# Patient Record
Sex: MALE | Race: WHITE | NOT HISPANIC OR LATINO | Employment: UNEMPLOYED | URBAN - NONMETROPOLITAN AREA
[De-identification: names, ages, dates, MRNs, and addresses within clinical notes are randomized per-mention and may not be internally consistent; named-entity substitution may affect disease eponyms.]

---

## 2017-10-03 ENCOUNTER — OFFICE VISIT (OUTPATIENT)
Dept: NEUROSURGERY | Facility: CLINIC | Age: 47
End: 2017-10-03

## 2017-10-03 VITALS
WEIGHT: 280 LBS | DIASTOLIC BLOOD PRESSURE: 95 MMHG | BODY MASS INDEX: 40.09 KG/M2 | HEIGHT: 70 IN | SYSTOLIC BLOOD PRESSURE: 158 MMHG

## 2017-10-03 DIAGNOSIS — F17.200 SMOKER: ICD-10-CM

## 2017-10-03 DIAGNOSIS — M48.061 SPINAL STENOSIS, LUMBAR REGION, WITHOUT NEUROGENIC CLAUDICATION: Primary | ICD-10-CM

## 2017-10-03 PROCEDURE — 99203 OFFICE O/P NEW LOW 30 MIN: CPT | Performed by: NURSE PRACTITIONER

## 2017-10-03 RX ORDER — LISINOPRIL 40 MG/1
TABLET ORAL
Refills: 0 | COMMUNITY
Start: 2017-09-22

## 2017-10-03 RX ORDER — HYDROCODONE BITARTRATE AND ACETAMINOPHEN 10; 325 MG/1; MG/1
TABLET ORAL
Refills: 0 | COMMUNITY
Start: 2017-09-25

## 2017-10-03 RX ORDER — OMEPRAZOLE 40 MG/1
CAPSULE, DELAYED RELEASE ORAL
Refills: 0 | COMMUNITY
Start: 2017-09-22

## 2017-10-03 RX ORDER — GABAPENTIN 800 MG/1
TABLET ORAL
Refills: 2 | COMMUNITY
Start: 2017-09-25

## 2017-10-03 RX ORDER — HYDROCHLOROTHIAZIDE 25 MG/1
TABLET ORAL
Refills: 0 | COMMUNITY
Start: 2017-09-22

## 2017-10-03 NOTE — PROGRESS NOTES
Chief complaint:   Chief Complaint   Patient presents with   • Back Pain     Hank is referred back today with a new problem, he is having low back, left hip pain and bilateral leg pain.  He states he has not tried any physical therapy for his back pain.  He was last seen in 2011, but that was for his neck.        Subjective     HPI: This is a 47-year-old male gentleman who has been seen by Dr. Tucker in the past and had a previous C4-5 and 5-6 ACDF back in 2011.  He comes in today with complaint of low back pain.  He states the pain is been going on in his back for about a year and a half now.  He denies any accident or injury.  He says the pain is back is constant.  He says its better when he is sitting however he cannot sit too long.  Its worse when he is bending.  He states that the pain will radiate down into his lower extremity is in a posterior radicular fashion to his calf area.  The left leg is worse than the right.  He also has associated numbness and tingling with this as well.  He says this pain is intermittent.  Its better when he sitting down and worse with movement.  Denies any bowel or bladder issues.  He has not done any recent physical therapy or chiropractic care.  He has had pain management injections in his back and says that they did work at first but they have not seemed to help as of late.  Rates the pain a scale 0-10 at an 8.  He is right-hand dominant.  He says the pain does interfere with his x-rays of daily living.  He currently works as a .  He is single.  He does smoke.  He is currently taking [place] from Dr. Blackwood.    Review of Systems   Constitutional: Positive for unexpected weight change.   Eyes:        Wears glasses   Endocrine: Positive for polyuria.   Musculoskeletal: Positive for back pain, joint swelling and neck pain.   Psychiatric/Behavioral: The patient is nervous/anxious.    All other systems reviewed and are negative.       Past Medical History:   Diagnosis  "Date   • Hypertension      Past Surgical History:   Procedure Laterality Date   • APPENDECTOMY     • GALLBLADDER SURGERY     • NECK SURGERY      He has had 2 neck surgeries.      Family History   Problem Relation Age of Onset   • Hypertension Mother    • Cancer Mother    • No Known Problems Father    • No Known Problems Brother      Social History   Substance Use Topics   • Smoking status: Current Every Day Smoker   • Smokeless tobacco: None   • Alcohol use No       (Not in a hospital admission)  Allergies:  Review of patient's allergies indicates no known allergies.    Objective      Vital Signs  /95 (BP Location: Right arm, Patient Position: Sitting)  Ht 70\" (177.8 cm)  Wt 280 lb (127 kg)  BMI 40.18 kg/m2    Physical Exam   Constitutional: He is oriented to person, place, and time. He appears well-developed and well-nourished.   HENT:   Head: Normocephalic.   Eyes: Conjunctivae, EOM and lids are normal. Pupils are equal, round, and reactive to light.   Neck: Normal range of motion.   Cardiovascular: Normal rate, regular rhythm and normal heart sounds.    Pulmonary/Chest: Effort normal and breath sounds normal.   Abdominal: Normal appearance.   Musculoskeletal: Normal range of motion.        Lumbar back: He exhibits pain.   Neurological: He is alert and oriented to person, place, and time. He has normal strength and normal reflexes. He displays normal reflexes. No cranial nerve deficit or sensory deficit. GCS eye subscore is 4. GCS verbal subscore is 5. GCS motor subscore is 6.   Skin: Skin is warm.   Psychiatric: He has a normal mood and affect. His speech is normal and behavior is normal. Thought content normal. Cognition and memory are normal.       Results Review: MRI of lumbar spine shows that the patient does have bilateral neural foraminal narrowing at L4-5 that is associated with facet hypertrophy.  There is no malalignment visualized.  Mild disc degeneration noted.  No Modic endplate changes.  No " cord signal change.  MRI was done on 08/17/2017 at primary [facility].          Assessment/Plan: At this point we are going to start the patient into a dedicated course of physical therapy consisting of 2-3 times a week for 4-6 weeks.  I will have him follow-up with Dr. Hector in 6 weeks to see how is doing from the physical therapy to see if there is anything from a surgical standpoint that needs to be addressed.  BMI shows that is very overweight.  BMI chart was given the patient.  He is a smoker.  Smoking cessation classes were given to the patient.      Hank was seen today for back pain.    Diagnoses and all orders for this visit:    Spinal stenosis, lumbar region, without neurogenic claudication  -     Ambulatory Referral to Physical Therapy Evaluate and treat (2-3 DAYS A WEEK FOR 4-6 WEEKS)    Smoker    BMI 40.0-44.9, adult        I discussed the patients findings and my recommendations with patient    Asaf Arita, BOB  10/03/17  9:20 AM

## 2019-04-24 ENCOUNTER — OUTSIDE FACILITY SERVICE (OUTPATIENT)
Dept: CARDIOLOGY | Facility: CLINIC | Age: 49
End: 2019-04-24

## 2019-04-25 ENCOUNTER — OUTSIDE FACILITY SERVICE (OUTPATIENT)
Dept: CARDIOLOGY | Facility: CLINIC | Age: 49
End: 2019-04-25

## 2019-04-25 PROCEDURE — 93272 ECG/REVIEW INTERPRET ONLY: CPT | Performed by: INTERNAL MEDICINE

## 2020-10-14 ENCOUNTER — TRANSCRIBE ORDERS (OUTPATIENT)
Dept: ADMINISTRATIVE | Facility: HOSPITAL | Age: 50
End: 2020-10-14

## 2020-10-14 DIAGNOSIS — M54.2 PAIN, NECK: Primary | ICD-10-CM

## 2020-10-20 ENCOUNTER — APPOINTMENT (OUTPATIENT)
Dept: CT IMAGING | Facility: HOSPITAL | Age: 50
End: 2020-10-20

## 2021-01-01 ENCOUNTER — TRANSCRIBE ORDERS (OUTPATIENT)
Dept: ADMINISTRATIVE | Facility: HOSPITAL | Age: 51
End: 2021-01-01

## 2021-01-01 DIAGNOSIS — M54.12 BRACHIAL NEURITIS: ICD-10-CM

## 2021-01-01 DIAGNOSIS — M54.16 LUMBAR RADICULOPATHY: Primary | ICD-10-CM
